# Patient Record
Sex: FEMALE | Race: OTHER | HISPANIC OR LATINO | URBAN - METROPOLITAN AREA
[De-identification: names, ages, dates, MRNs, and addresses within clinical notes are randomized per-mention and may not be internally consistent; named-entity substitution may affect disease eponyms.]

---

## 2020-07-17 ENCOUNTER — EMERGENCY (EMERGENCY)
Facility: HOSPITAL | Age: 85
LOS: 1 days | Discharge: HOME | End: 2020-07-17
Attending: EMERGENCY MEDICINE
Payer: MEDICARE

## 2020-07-17 VITALS
DIASTOLIC BLOOD PRESSURE: 84 MMHG | OXYGEN SATURATION: 98 % | TEMPERATURE: 98 F | SYSTOLIC BLOOD PRESSURE: 161 MMHG | RESPIRATION RATE: 18 BRPM | HEART RATE: 96 BPM

## 2020-07-17 PROCEDURE — 99283 EMERGENCY DEPT VISIT LOW MDM: CPT

## 2020-07-17 PROCEDURE — U0003: CPT

## 2020-07-17 NOTE — ED PROVIDER NOTE - NSFOLLOWUPINSTRUCTIONS_ED_ALL_ED_FT
You may have Coronavirus      Please follow instruction on provided COVID-19 discharge educational forms and self quarantine for 14 days.     Return to the ED immediately if you have *shortness of breath*, fever, pain, weakness, vomiting any concerns.    RETURN TO THE ER IMMEDIATELY IF YOU HAVE WORSENING SHORTNESS OF BREATH OR Oxygen < 93%

## 2020-07-17 NOTE — ED ADULT NURSE NOTE - CAS ELECT INFOMATION PROVIDED
pt seen and examined MD/ACP . Pt swab for COVID 19 and discharge  by MD/ACP d/c intructions given./DC instructions

## 2020-07-17 NOTE — ED PROVIDER NOTE - PATIENT PORTAL LINK FT
You can access the FollowMyHealth Patient Portal offered by [facility] by registering at the following website: [email or web address]. By joining [facility]’s FollowMyHealth portal, you will also be able to view your health information using other applications (apps) compatible with our system.

## 2020-07-17 NOTE — ED PROVIDER NOTE - ATTENDING CONTRIBUTION TO CARE
47 year old female presents for COVID testing.  Patient states that she is going to have a procedure done next week and requires a negative COVID test.

## 2020-07-18 LAB — SARS-COV-2 RNA SPEC QL NAA+PROBE: SIGNIFICANT CHANGE UP

## 2021-02-26 ENCOUNTER — EMERGENCY (EMERGENCY)
Facility: HOSPITAL | Age: 86
LOS: 1 days | Discharge: HOME | End: 2021-02-26
Attending: STUDENT IN AN ORGANIZED HEALTH CARE EDUCATION/TRAINING PROGRAM
Payer: MEDICARE

## 2021-02-26 VITALS
DIASTOLIC BLOOD PRESSURE: 79 MMHG | OXYGEN SATURATION: 99 % | SYSTOLIC BLOOD PRESSURE: 162 MMHG | TEMPERATURE: 98 F | HEIGHT: 58.66 IN | RESPIRATION RATE: 17 BRPM | WEIGHT: 100.53 LBS | HEART RATE: 90 BPM

## 2021-02-26 VITALS
TEMPERATURE: 98 F | OXYGEN SATURATION: 98 % | RESPIRATION RATE: 17 BRPM | HEART RATE: 77 BPM | DIASTOLIC BLOOD PRESSURE: 77 MMHG | SYSTOLIC BLOOD PRESSURE: 148 MMHG

## 2021-02-26 PROBLEM — Z78.9 OTHER SPECIFIED HEALTH STATUS: Chronic | Status: ACTIVE | Noted: 2020-07-17

## 2021-02-26 LAB
ALBUMIN SERPL ELPH-MCNC: 3.4 G/DL — LOW (ref 3.5–5)
ALP SERPL-CCNC: 73 U/L — SIGNIFICANT CHANGE UP (ref 40–120)
ALT FLD-CCNC: 17 U/L DA — SIGNIFICANT CHANGE UP (ref 10–60)
ANION GAP SERPL CALC-SCNC: 5 MMOL/L — SIGNIFICANT CHANGE UP (ref 5–17)
APPEARANCE UR: CLEAR — SIGNIFICANT CHANGE UP
AST SERPL-CCNC: 12 U/L — SIGNIFICANT CHANGE UP (ref 10–40)
BACTERIA # UR AUTO: ABNORMAL /HPF
BASOPHILS # BLD AUTO: 0.05 K/UL — SIGNIFICANT CHANGE UP (ref 0–0.2)
BASOPHILS NFR BLD AUTO: 0.5 % — SIGNIFICANT CHANGE UP (ref 0–2)
BILIRUB SERPL-MCNC: 1.2 MG/DL — SIGNIFICANT CHANGE UP (ref 0.2–1.2)
BILIRUB UR-MCNC: NEGATIVE — SIGNIFICANT CHANGE UP
BUN SERPL-MCNC: 27 MG/DL — HIGH (ref 7–18)
CALCIUM SERPL-MCNC: 9 MG/DL — SIGNIFICANT CHANGE UP (ref 8.4–10.5)
CHLORIDE SERPL-SCNC: 103 MMOL/L — SIGNIFICANT CHANGE UP (ref 96–108)
CO2 SERPL-SCNC: 29 MMOL/L — SIGNIFICANT CHANGE UP (ref 22–31)
COLOR SPEC: YELLOW — SIGNIFICANT CHANGE UP
COMMENT - URINE: SIGNIFICANT CHANGE UP
CREAT SERPL-MCNC: 0.82 MG/DL — SIGNIFICANT CHANGE UP (ref 0.5–1.3)
DIFF PNL FLD: ABNORMAL
EOSINOPHIL # BLD AUTO: 0.07 K/UL — SIGNIFICANT CHANGE UP (ref 0–0.5)
EOSINOPHIL NFR BLD AUTO: 0.7 % — SIGNIFICANT CHANGE UP (ref 0–6)
EPI CELLS # UR: ABNORMAL /HPF
GLUCOSE SERPL-MCNC: 92 MG/DL — SIGNIFICANT CHANGE UP (ref 70–99)
GLUCOSE UR QL: NEGATIVE — SIGNIFICANT CHANGE UP
HCT VFR BLD CALC: 37.1 % — SIGNIFICANT CHANGE UP (ref 34.5–45)
HGB BLD-MCNC: 12.3 G/DL — SIGNIFICANT CHANGE UP (ref 11.5–15.5)
HYALINE CASTS # UR AUTO: ABNORMAL /LPF
IMM GRANULOCYTES NFR BLD AUTO: 0.4 % — SIGNIFICANT CHANGE UP (ref 0–1.5)
KETONES UR-MCNC: NEGATIVE — SIGNIFICANT CHANGE UP
LEUKOCYTE ESTERASE UR-ACNC: ABNORMAL
LYMPHOCYTES # BLD AUTO: 1.6 K/UL — SIGNIFICANT CHANGE UP (ref 1–3.3)
LYMPHOCYTES # BLD AUTO: 16.4 % — SIGNIFICANT CHANGE UP (ref 13–44)
MAGNESIUM SERPL-MCNC: 2.5 MG/DL — SIGNIFICANT CHANGE UP (ref 1.6–2.6)
MCHC RBC-ENTMCNC: 31.5 PG — SIGNIFICANT CHANGE UP (ref 27–34)
MCHC RBC-ENTMCNC: 33.2 GM/DL — SIGNIFICANT CHANGE UP (ref 32–36)
MCV RBC AUTO: 94.9 FL — SIGNIFICANT CHANGE UP (ref 80–100)
MONOCYTES # BLD AUTO: 0.68 K/UL — SIGNIFICANT CHANGE UP (ref 0–0.9)
MONOCYTES NFR BLD AUTO: 7 % — SIGNIFICANT CHANGE UP (ref 2–14)
NEUTROPHILS # BLD AUTO: 7.32 K/UL — SIGNIFICANT CHANGE UP (ref 1.8–7.4)
NEUTROPHILS NFR BLD AUTO: 75 % — SIGNIFICANT CHANGE UP (ref 43–77)
NITRITE UR-MCNC: NEGATIVE — SIGNIFICANT CHANGE UP
NRBC # BLD: 0 /100 WBCS — SIGNIFICANT CHANGE UP (ref 0–0)
PH UR: 6.5 — SIGNIFICANT CHANGE UP (ref 5–8)
PLATELET # BLD AUTO: 401 K/UL — HIGH (ref 150–400)
POTASSIUM SERPL-MCNC: 4.1 MMOL/L — SIGNIFICANT CHANGE UP (ref 3.5–5.3)
POTASSIUM SERPL-SCNC: 4.1 MMOL/L — SIGNIFICANT CHANGE UP (ref 3.5–5.3)
PROT SERPL-MCNC: 7 G/DL — SIGNIFICANT CHANGE UP (ref 6–8.3)
PROT UR-MCNC: NEGATIVE — SIGNIFICANT CHANGE UP
RBC # BLD: 3.91 M/UL — SIGNIFICANT CHANGE UP (ref 3.8–5.2)
RBC # FLD: 14.3 % — SIGNIFICANT CHANGE UP (ref 10.3–14.5)
RBC CASTS # UR COMP ASSIST: ABNORMAL /HPF (ref 0–2)
SODIUM SERPL-SCNC: 137 MMOL/L — SIGNIFICANT CHANGE UP (ref 135–145)
SP GR SPEC: 1.01 — SIGNIFICANT CHANGE UP (ref 1.01–1.02)
TSH SERPL-MCNC: 1.15 UU/ML — SIGNIFICANT CHANGE UP (ref 0.34–4.82)
UROBILINOGEN FLD QL: NEGATIVE — SIGNIFICANT CHANGE UP
WBC # BLD: 9.76 K/UL — SIGNIFICANT CHANGE UP (ref 3.8–10.5)
WBC # FLD AUTO: 9.76 K/UL — SIGNIFICANT CHANGE UP (ref 3.8–10.5)
WBC UR QL: SIGNIFICANT CHANGE UP /HPF (ref 0–5)

## 2021-02-26 PROCEDURE — 80053 COMPREHEN METABOLIC PANEL: CPT

## 2021-02-26 PROCEDURE — 71046 X-RAY EXAM CHEST 2 VIEWS: CPT

## 2021-02-26 PROCEDURE — 87086 URINE CULTURE/COLONY COUNT: CPT

## 2021-02-26 PROCEDURE — 93010 ELECTROCARDIOGRAM REPORT: CPT

## 2021-02-26 PROCEDURE — 83735 ASSAY OF MAGNESIUM: CPT

## 2021-02-26 PROCEDURE — 74177 CT ABD & PELVIS W/CONTRAST: CPT | Mod: 26

## 2021-02-26 PROCEDURE — 71260 CT THORAX DX C+: CPT

## 2021-02-26 PROCEDURE — 93005 ELECTROCARDIOGRAM TRACING: CPT

## 2021-02-26 PROCEDURE — 81001 URINALYSIS AUTO W/SCOPE: CPT

## 2021-02-26 PROCEDURE — 71046 X-RAY EXAM CHEST 2 VIEWS: CPT | Mod: 26

## 2021-02-26 PROCEDURE — 36415 COLL VENOUS BLD VENIPUNCTURE: CPT

## 2021-02-26 PROCEDURE — 84443 ASSAY THYROID STIM HORMONE: CPT

## 2021-02-26 PROCEDURE — 99283 EMERGENCY DEPT VISIT LOW MDM: CPT

## 2021-02-26 PROCEDURE — 74177 CT ABD & PELVIS W/CONTRAST: CPT

## 2021-02-26 PROCEDURE — 71260 CT THORAX DX C+: CPT | Mod: 26

## 2021-02-26 PROCEDURE — 99284 EMERGENCY DEPT VISIT MOD MDM: CPT | Mod: 25

## 2021-02-26 PROCEDURE — 85025 COMPLETE CBC W/AUTO DIFF WBC: CPT

## 2021-02-26 NOTE — ED PROVIDER NOTE - NSFOLLOWUPINSTRUCTIONS_ED_ALL_ED_FT
Hiperplasia de endometrio    Endometrial Hyperplasia       La hiperplasia de endometrio es un engrosamiento anormal del tejido que recubre el interior del útero (endometrio). Esta afección puede causar cambios en las células (atipia) que pueden producir cáncer de endometrio.  Existen cuatro tipos de hiperplasia de endometrio:  •Engrosamiento de endometrio únicamente (hiperplasia simple).      •Engrosamiento de endometrio y acumulación de células (hiperplasia compleja).      •Engrosamiento de endometrio y cambios en las células (hiperplasia simple con atipia). Natalia tipo tiene un riesgo bajo de derivar en cáncer.      •Engrosamiento de endometrio, acumulación de células y cambios en las células (hiperplasia compleja con atipia). Natalia tipo tiene un riesgo mayor de desarrollarse en cáncer.      El diagnóstico y tratamiento tempranos son muy importantes para reducir el riesgo de cáncer.      ¿Cuáles son las causas?    Esta afección es causada por un desequilibrio de las hormonas reproductivas, estrógeno y progesterona. Al comienzo de nick ciclo menstrual, los ovarios producen estrógeno. Natalia engrosa la membrana que cubre el útero para prepararlo para el embarazo. Si no hay embarazo, el nivel de estrógeno baja. Luego, la progesterona hace que el organismo elimine el revestimiento del útero. Natalia es nick período menstrual. La hiperplasia de endometrio resulta de mary cantidad excesiva de estrógeno y mary cantidad insuficiente de progesterona.      ¿Qué incrementa el riesgo?  Los siguientes factores pueden hacer que usted sea más propenso a tener esta enfermedad:  •Ser mayor de 35 años. Esta afección es más frecuente inmediatamente antes o después de la menopausia. La menopausia ocurre cuando transcurren 12 meses sin un período menstrual.      •Babs un medicamento con estrógenos o un medicamento que actúe christina el estrógeno.      •Tener síndrome del ovario poliquístico (SOP).      •Tener sobrepeso.      •Fumar.      •Tener diabetes.      •Tener períodos irregulares.      •Philip comenzado a menstruar de forma temprana.      •Philip comenzado la menopausia de forma tardía.      •No philip tenido embarazos.      •Tener antecedentes familiares de cáncer de útero, de ovarios o de colon.      •Tener otras enfermedades christina la enfermedad de la vesícula biliar o la enfermedad tiroidea.        ¿Cuáles son los signos o los síntomas?  El síntoma principal de esta afección es un sangrado anormal de la vagina. El sangrado puede tener las siguientes características:  •Más abundante o prolongado jj los períodos menstruales.      •Sucede con mayor frecuencia que un ciclo menstrual normal.      •Ocurre después de la menopausia.        ¿Cómo se diagnostica?  Esta afección se puede diagnosticar en función de lo siguiente:  •Los síntomas y antecedentes médicos, incluidos los factores de riesgo.      •Un examen físico.    •Estudios, por ejemplo:  •Un estudio de diagnóstico por imágenes con ondas sonoras (ecografía transvaginal) para examinar el endometrio. Natalia estudio utiliza mary sonda de ondas sonoras que se inserta en la vagina.      •Un procedimiento para babs mary pequeña muestra del tejido del endometrio a través de la vagina para examinarla en el microscopio (biopsia de endometrio).      •Un procedimiento en el que se raspa o succiona el tejido del interior del útero (dilatación y legrado).      •Un procedimiento en el que un dispositivo brambila y con soheila se inserta en el útero a través del rashida uterino para solis el interior del útero (histeroscopia).          ¿Cómo se trata?    El tratamiento de esta afección depende del tipo de hiperplasia que tenga. El tratamiento más frecuente es la progesterona sintética (progestina). La progestina se puede administrar christina mary píldora, inyección o mary crema vaginal o christina un dispositivo que se implanta en el útero (dispositivo intrauterino, DIU).  Si tiene hiperplasia con atipia, es posible que deban extirparle el útero con mary cirugía (histerectomía). Tiene mayor probabilidad de que le realicen natalia procedimiento si:  •Tiene hiperplasia compleja con atipia.      •Es postmenopáusica.      •No quiere quedar embarazada.        Siga estas indicaciones en nick casa:     •Chitina los medicamentos de venta brent y los recetados solamente christina se lo haya indicado el médico.      • No consuma ningún producto que contenga nicotina o tabaco, hcristina cigarrillos y cigarrillos electrónicos. Si necesita ayuda para dejar de fumar, consulte al médico.      •Baje de peso si es necesario. Pídale al médico que le recomiende mary dieta y ejercicios que le ayuden a alcanzar y mantener un peso saludable.      •Concurra a todas las visitas de control christina se lo haya indicado el médico. [place] es importante.        Comuníquese con un médico si:    •Tiene períodos menstruales más abundantes o prolongados que lo normal.      •Tiene períodos menstruales con más frecuencia que la habitual.      •Tiene sangrado vaginal después de la menopausia.        Solicite ayuda de inmediato si:    •Tiene sangrado vaginal que es tan abundante que necesita cambiar el tampón o toalla sanitaria en un período de tiempo dc a las 2 horas.      •Elimina coágulos de uvaldo más grandes que mary moneda de 25 centavos.        Resumen    •La hiperplasia de endometrio es un engrosamiento anormal del tejido que recubre el interior del útero (endometrio).      •El síntoma principal de esta afección es un sangrado anormal de la vagina. Natalia sangrado puede ser más abundante, prolongado, más frecuentes u ocurrir después de la menopausia.      •Algunos tipos de hiperplasia también causan cambios en las células (atipia) que pueden derivar en cáncer.      •Siempre informe a nick médico acerca del sangrado vaginal anormal.      •El diagnóstico y tratamiento tempranos son muy importantes para reducir el riesgo de cáncer.      Esta información no tiene christina fin reemplazar el consejo del médico. Asegúrese de hacerle al médico cualquier pregunta que tenga.      Document Revised: 03/22/2018    Elsevier Patient Education © 2020 [employer]

## 2021-02-26 NOTE — ED ADULT NURSE NOTE - NSIMPLEMENTINTERV_GEN_ALL_ED
Implemented All Fall with Harm Risk Interventions:  [place] to call system. Call bell, personal items and telephone within reach. Instruct patient to call for assistance. Room bathroom lighting operational. Non-slip footwear when patient is off stretcher. Physically safe environment: no spills, clutter or unnecessary equipment. Stretcher in lowest position, wheels locked, appropriate side rails in place. Provide visual cue, wrist band, yellow gown, etc. Monitor gait and stability. Monitor for mental status changes and reorient to person, place, and time. Review medications for side effects contributing to fall risk. Reinforce activity limits and safety measures with patient and family. Provide visual clues: red socks.

## 2021-02-26 NOTE — ED PROVIDER NOTE - CLINICAL SUMMARY MEDICAL DECISION MAKING FREE TEXT BOX
85F presenting with night sweats. denies any back pain. decreased PO and weight loss. no recent travel or sick contacts. low concern for TB. possible electrolyte abnormality, carcinoma. will get labs, cxr. will reassess.

## 2021-02-26 NOTE — ED PROVIDER NOTE - OBJECTIVE STATEMENT
85F, no pmh, presenting with night sweats. patient reports three weeks of feeling hot at night, particularly on her back with night sweats. reports she has lost weight (cannot say how much), and has a decreased appetite. denies any back pain. no chest pain, shortness of breath, abdominal pain, pain or burning with urination, or headache. no history of cancer or TB. no recent travel or sick contacts. 85F, no pmh, presenting with night sweats. patient reports three weeks of feeling hot at night, particularly on her back with night sweats. reports she has lost weight (cannot say how much), and has a decreased appetite. denies any back pain. no chest pain, shortness of breath, abdominal pain, pain or burning with urination, or headache. no history of cancer or TB. no recent travel or sick contacts.    Brother: [phone]

## 2021-02-26 NOTE — ED ADULT TRIAGE NOTE - CHIEF COMPLAINT QUOTE
patient brought in by daughter for mid upper back pain x 2 weeks. denies any chest pains or shortness of breath no trauma

## 2021-02-26 NOTE — ED PROVIDER NOTE - PATIENT PORTAL LINK FT
You can access the FollowMyHealth Patient Portal offered by [facility] by registering at the following website: [email or web address]. By joining [facility]’s FollowMyHealth portal, you will also be able to view your health information using other applications (apps) compatible with our system.

## 2021-02-26 NOTE — ED PROVIDER NOTE - PROGRESS NOTE DETAILS
nodule on cxt. will get CT. steph Piper MAGALIE: Patient signed out to me by Dr. Piper. Awaiting CT chest/abd/pel. Patient is resting comfortably, NAD. I explained CT findings and plan using Italian video . Also spoke to patient's brother-in-law Chevy, who will pick patient up. Told him finding and need to f/u iwht a gynecologist. Info sent to care coordinator.

## 2021-02-27 LAB
CULTURE RESULTS: SIGNIFICANT CHANGE UP
SPECIMEN SOURCE: SIGNIFICANT CHANGE UP

## 2022-06-30 NOTE — ED PROVIDER NOTE - OBJECTIVE STATEMENT
85 y/o F patient presents to the ED for covid19 testing. Patient is requesting test with her sister. Patient denies any chest pain, shortness of breath, fevers, chills, respiratory symptoms or any symptoms. Translation services for Angolan were utilized. (Fahad, [id number]) Fear of needles and procedures 89 year old female presents for COVID testing.  Patient states that she is going to have a procedure done next week and requires a negative COVID test.ent denies any chest pain, shortness of breath, fevers, chills, respiratory symptoms or any symptoms. Translation services for [place]raini were utilized. (Fahad, [id number])

## 2024-12-30 NOTE — ED PROVIDER NOTE - CPE EDP SKIN NORM
Pt presents to the ED c.o fall. Pt reports that she tripped and fell outside, + head strike, pt denies LOC, not on AC.
normal...